# Patient Record
Sex: FEMALE | Race: ASIAN
[De-identification: names, ages, dates, MRNs, and addresses within clinical notes are randomized per-mention and may not be internally consistent; named-entity substitution may affect disease eponyms.]

---

## 2020-02-02 ENCOUNTER — HOSPITAL ENCOUNTER (EMERGENCY)
Dept: HOSPITAL 62 - ER | Age: 45
Discharge: HOME | End: 2020-02-02
Payer: COMMERCIAL

## 2020-02-02 VITALS — DIASTOLIC BLOOD PRESSURE: 64 MMHG | SYSTOLIC BLOOD PRESSURE: 150 MMHG

## 2020-02-02 DIAGNOSIS — R05: ICD-10-CM

## 2020-02-02 DIAGNOSIS — M79.10: ICD-10-CM

## 2020-02-02 DIAGNOSIS — J02.9: Primary | ICD-10-CM

## 2020-02-02 LAB
A TYPE INFLUENZA AG: NEGATIVE
B INFLUENZA AG: NEGATIVE

## 2020-02-02 PROCEDURE — 87077 CULTURE AEROBIC IDENTIFY: CPT

## 2020-02-02 PROCEDURE — 87070 CULTURE OTHR SPECIMN AEROBIC: CPT

## 2020-02-02 PROCEDURE — 87804 INFLUENZA ASSAY W/OPTIC: CPT

## 2020-02-02 PROCEDURE — 71046 X-RAY EXAM CHEST 2 VIEWS: CPT

## 2020-02-02 PROCEDURE — 99283 EMERGENCY DEPT VISIT LOW MDM: CPT

## 2020-02-02 PROCEDURE — 87880 STREP A ASSAY W/OPTIC: CPT

## 2020-02-02 NOTE — RADIOLOGY REPORT (SQ)
EXAM DESCRIPTION:  CHEST 2 VIEWS



COMPLETED DATE/TIME:  2/2/2020 12:13 pm



REASON FOR STUDY:  cough



COMPARISON:  1/1/2015.



EXAM PARAMETERS:  NUMBER OF VIEWS: two views

TECHNIQUE: Digital Frontal and Lateral radiographic views of the chest acquired.

RADIATION DOSE: NA

LIMITATIONS: none



FINDINGS:  LUNGS AND PLEURA: Low lung volumes.  No opacities, masses or pneumothorax. No pleural effu
liana.

MEDIASTINUM AND HILAR STRUCTURES: No masses or contour abnormalities.

HEART AND VASCULAR STRUCTURES: Heart normal size.  No evidence for failure.

BONES: No acute findings.

HARDWARE: Pacemaker and electrodes.

OTHER: No other significant finding.



IMPRESSION:  NO ACUTE RADIOGRAPHIC FINDING IN THE CHEST.



TECHNICAL DOCUMENTATION:  JOB ID:  [id number]

 2011 [employer]- All Rights Reserved



Reading location - IP/workstation name: [email or web address]

## 2020-02-02 NOTE — ER DOCUMENT REPORT
HPI





- HPI


Patient complains to provider of: cough sore throat body aches


Time Seen by Provider: 02/02/20 11:38


Onset: Other - Tuesday


Quality of pain: Achy


Pain Level: 2


Context: 





44-year-old female presents emergency department with complaints of cough sore 

throat body aches since Tuesday.  Denies vomiting diarrhea fever.  Reports 

symptoms she coughed so hard she is incontinent of urine.  Patient reports she 

works at [employer].  Did not receive her flu vaccine this year.  She reports she 

took NyQuil last night without relief of symptoms.


Associated Symptoms: Body/muscle aches, Nonproductive cough, Sore throat


Exacerbated by: Denies


Relieved by: Denies


Similar symptoms previously: No


Recently seen / treated by doctor: No





- REPRODUCTIVE


Reproductive: DENIES: Pregnant:





Past Medical History





- General


Information source: Patient


Last Menstrual Period: Irregular





- Social History


Smoking Status: Never Smoker


Cigarette use (# per day): No


Frequency of alcohol use: None


Drug Abuse: None


Occupation: [employer]


Family History: Reviewed & Not Pertinent


Patient has suicidal ideation: No


Patient has homicidal ideation: No





- Past Medical History


Cardiac Medical History: Reports: Other - Pacemaker for low heart rate


Pulmonary Medical History: Reports: Hx Asthma


Past Surgical History: Reports: Hx Cardiac Surgery - pacemaker





- Immunizations


Hx Diphtheria, Pertussis, Tetanus Vaccination: No





Vertical Provider Document





- CONSTITUTIONAL


Agree With Documented VS: Yes


Exam Limitations: No Limitations


General Appearance: WD/WN, No Apparent Distress





- INFECTION CONTROL


TRAVEL OUTSIDE OF THE [place] IN LAST 30 DAYS: No





- HEENT


HEENT: Atraumatic, Normocephalic, Pharyngeal Exudate, Pharyngeal Erythema - 

Tonsillar hypertrophy, good airway clear voice opens mouth wide no trismus no 

Víctor no peritonsillar abscess.  negative: Tympanic Membrane Red, Tympanic 

Membrane Bulging





- NECK


Neck: Normal Inspection, Supple.  negative: Lymphadenopathy-Left, 

Lymphadenopathy-Right





- RESPIRATORY


Respiratory: Breath Sounds Normal, No Respiratory Distress.  negative: Rhonchi, 

Wheezing





- CARDIOVASCULAR


Cardiovascular: Regular Rate, Regular Rhythm





- GI/ABDOMEN


Gastrointestinal: Abdomen Soft, Abdomen Non-Tender





- BACK


Back: Normal Inspection





- MUSCULOSKELETAL/EXTREMETIES


Musculoskeletal/Extremeties: MAEW, FROM





- NEURO


Level of Consciousness: Awake, Alert, Appropriate


Motor/Sensory: No Motor Deficit





- DERM


Integumentary: Warm, Dry





Course





- Re-evaluation


Re-evalutation: 





02/02/20 12:36


                                        





Chest X-Ray  02/02/20 11:42


IMPRESSION:  NO ACUTE RADIOGRAPHIC FINDING IN THE CHEST.


 








Laboratory











  02/02/20 02/02/20





  11:55 11:55


 


Influenza A (Rapid)   NEGATIVE


 


Influenza B (Rapid)   NEGATIVE


 


Group A Strep Rapid  NEGATIVE 











02/02/20 12:41


Chest x-ray negative strep and flu test negative.  Patient was instructed on all

results.  Instructed on Tessalon.'s.  Patient was instructed push fluids good 

handwashing return for any difficulty breathing concerns.  She verbalized 

understanding to all instructions.





- Vital Signs


Vital signs: 


                                        











Temp Pulse Resp BP Pulse Ox


 


 98.6 F   63   20   150/64 H  96 


 


 02/02/20 11:05  02/02/20 11:05  02/02/20 11:05  02/02/20 11:05  02/02/20 11:05














- Diagnostic Test


Radiology reviewed: Reports reviewed





Discharge





- Discharge


Clinical Impression: 


 Cough, Sore throat





Condition: Stable


Disposition: HOME, SELF-CARE


Instructions:  Use of Over-The-Counter Ibuprofen ([facility]), Sore Throat ([facility]), 

Tessalon Perles ([facility])


Additional Instructions: 


*You have been evaluated for a cough, sore throat


*Your strep test was negative.  A throat culture is pending.  Should that return

positive you will be contacted in 3 to 4 days


In the meantime gargle with warm salt water and suck on throat lozenges for 

comfort, push fluids


Your flu test was negative


Your chest x-ray was negative for pneumonia


*Take medication as prescribed for cough


*Monitor your temperature, take Tylenol as indicated


*Follow up with a primary care provider within 1 week for recheck


*Return to ED for increasing fever, cough, worsening condition, changes, needs, 

difficulty breathing





Prescriptions: 


Benzonatate [Tessalon Perles 100 mg Capsule] 100 mg PO ASDIR PRN #20 capsule


 PRN Reason: 


Forms:  Elevated Blood Pressure, Return to Work